# Patient Record
(demographics unavailable — no encounter records)

---

## 2025-03-25 NOTE — HISTORY OF PRESENT ILLNESS
[de-identified] : 54M, RHD presents with right elbow pain since beginning of March 2025, no specific cause of injury/trauma. Patient was seen by PCP who ordered an x-ray performed at [facility] with evidence of a fracture of a olecranon spur. Denies numbness/ tingling.

## 2025-03-25 NOTE — ASSESSMENT
[FreeTextEntry1] : RIGHT ELBOW EXAM +ttp at lateral epicondyle, worse with resisted wrist extension. Skin intact No deformity, edema, or ecchymosis Hand with brisk capillary refill, warm and well perfused Motor function intact to AIN, PIN, ulnar nerves Sensation intact median, ulnar, radial nerves  Elbow ROM   Flexion 135   Extension to 0   Supination 85   Pronation 85  No elbow instability noted Strength for elbow flexion & extension is 5/5 Hand and wrist motion is intact and full Patient able to make a composite fist  Right elbow radiographs with no fracture nor dislocation. Triceps enthesophyte. (3-view as viewed by me from outside facility)  ASSESSMENT/PLAN Right Lateral Epicondylitis The diagnosis of lateral epicondylitis and treatment options were discussed at length with the patient today.  I discussed that in terms of the natural history of the condition, it can sometimes take many months to improve.  I discussed treatment options including observation, activity modification including avoiding lifting with the hand pronated and instead lifting with the hand supinated, oral anti-inflammatories, hand therapy, counterforce brace, and steroid injection.  I discussed that for the steroid injection, the literature proves this to be no better than a placebo, however, it is still a potential option for the patient.  Furthermore, if this condition is recalcitrant, I discussed obtaining an MRI scan to assess both the lateral epicondyle as well as the radiocapitellar joint and specifically for a potential plica that could be impinging on the radiocapitellar joint. All of the patient's questions were answered to their satisfaction.  F/u 3mo